# Patient Record
Sex: FEMALE | Race: OTHER | HISPANIC OR LATINO
[De-identification: names, ages, dates, MRNs, and addresses within clinical notes are randomized per-mention and may not be internally consistent; named-entity substitution may affect disease eponyms.]

---

## 2023-08-25 ENCOUNTER — APPOINTMENT (OUTPATIENT)
Dept: NEUROLOGY | Facility: CLINIC | Age: 28
End: 2023-08-25
Payer: MEDICAID

## 2023-08-25 VITALS
WEIGHT: 146 LBS | DIASTOLIC BLOOD PRESSURE: 83 MMHG | SYSTOLIC BLOOD PRESSURE: 122 MMHG | OXYGEN SATURATION: 98 % | HEART RATE: 94 BPM | TEMPERATURE: 98.1 F

## 2023-08-25 DIAGNOSIS — Z78.9 OTHER SPECIFIED HEALTH STATUS: ICD-10-CM

## 2023-08-25 DIAGNOSIS — R42 DIZZINESS AND GIDDINESS: ICD-10-CM

## 2023-08-25 PROBLEM — Z00.00 ENCOUNTER FOR PREVENTIVE HEALTH EXAMINATION: Status: ACTIVE | Noted: 2023-08-25

## 2023-08-25 PROCEDURE — 99204 OFFICE O/P NEW MOD 45 MIN: CPT

## 2023-08-25 NOTE — REVIEW OF SYSTEMS
[As Noted in HPI] : as noted in HPI [Dizziness] : dizziness [Migraine Headache] : migraine headaches [Fever] : no fever [Chills] : no chills [Confused or Disoriented] : no confusion [Memory Lapses or Loss] : no memory loss [Facial Weakness] : no facial weakness [Arm Weakness] : no arm weakness [Hand Weakness] : no hand weakness [Leg Weakness] : no leg weakness [Numbness] : no numbness [Tingling] : no tingling [Anxiety] : no anxiety [Depression] : no depression [Eye Pain] : no eye pain [Loss Of Hearing] : no hearing loss

## 2023-08-25 NOTE — HISTORY OF PRESENT ILLNESS
[FreeTextEntry1] : Sheila Toledo is a 28 year-old woman who presented to [facility] with headaches, dizziness, tinnitus and blurry vision x 15 days. CTH normal. Symptoms initially resolved and then returned about 4 days ago. She describes pain as a pulsatile or throbbing sensation around her head with associated neck pain, blurred vision, dizziness, sound sensitivity and sensation of water in her ears. She had headaches similar to this in the past about 6 years ago which worsened while she was pregnant. Headaches generally relieved by taking OTC analgesics but she states symptoms return later in the day. Headaches come on about several times a week and often last 1-2 days. She has no further complaints.

## 2023-08-25 NOTE — REASON FOR VISIT
[Initial Evaluation] : an initial evaluation [[employer] ] : provided by [employer]   [Interpreters_IDNumber] : [id number] [Interpreters_FullName] : Hilda

## 2023-08-25 NOTE — PHYSICAL EXAM
[FreeTextEntry1] : GENERAL PHYSICAL EXAM: GEN: no distress, normal affect EYES: sclera white, conjunctiva clear, no nystagmus CV: normal rhythm PULM: no respiratory distress, normal rhythm and effort EXT: no edema, no cyanosis MSK: muscle tone and strength normal SKIN: warm, dry, no rash or lesion on exposed skin   NEUROLOGICAL EXAM: Mental Status Orientation: alert and oriented to person, place, time, and situation Language: clear and fluent, intact comprehension and repetition  Cranial Nerves II: visual fields full to confrontation  III, IV, VI: PERRL, EOMI V, VII: facial sensation and movement intact and symmetric  VIII: hearing intact  IX, X: uvula midline, soft palate elevates normally  XI: BL shoulder shrug intact  XII: tongue midline  Motor Shoulder abd: 5 (R), 5 (L) EF/EE: 5 (R), 5 (L) WF/WE: 5 (R), 5 (L) hand : 5 (R), 5 (L) HF/HE: 5 (R), 5 (L) KF/KE: 5 (R), 5 (L) DF/PF: 5 (R), 5 (L)  Tone and bulk are normal in upper and lower limbs No pronator drift  Sensation Intact to light touch in all 4 EXTs  Reflex 2+ in BL biceps, brachioradialis, patella  Coordination Normal FTN bilaterally Able to perform rapid, alternating movements  Gait Normal stance, stride, and pivot turn

## 2023-08-25 NOTE — DISCUSSION/SUMMARY
[FreeTextEntry1] : Sheila Toledo is a 28 year-old woman who presented to the office today for evaluation of headaches. CTH at [facility] was unremarkable. Will order MRI and MRV head, as she recently started oral contraceptive medication. We discussed the roles of abortive and preventative medicine, and she is interested in starting headache prophylaxis. Will begin nortriptyline 10mg QHS and she will continue OTC analgesics PRN. She was advised to limit PRN use to no more than 3x/week to reduce risk of medication overuse headaches. Follow- up with me in 2 months. All of her questions and concerns were addressed.

## 2023-09-01 ENCOUNTER — APPOINTMENT (OUTPATIENT)
Dept: MRI IMAGING | Facility: CLINIC | Age: 28
End: 2023-09-01
Payer: MEDICAID

## 2023-09-01 ENCOUNTER — OUTPATIENT (OUTPATIENT)
Dept: OUTPATIENT SERVICES | Facility: HOSPITAL | Age: 28
LOS: 1 days | End: 2023-09-01

## 2023-09-01 DIAGNOSIS — R51.9 HEADACHE, UNSPECIFIED: ICD-10-CM

## 2023-09-01 PROCEDURE — 70551 MRI BRAIN STEM W/O DYE: CPT | Mod: 26

## 2023-09-01 PROCEDURE — 70544 MR ANGIOGRAPHY HEAD W/O DYE: CPT | Mod: 26,XU

## 2023-09-06 ENCOUNTER — NON-APPOINTMENT (OUTPATIENT)
Age: 28
End: 2023-09-06

## 2023-10-27 ENCOUNTER — APPOINTMENT (OUTPATIENT)
Dept: NEUROLOGY | Facility: CLINIC | Age: 28
End: 2023-10-27
Payer: MEDICAID

## 2023-10-27 VITALS
WEIGHT: 143 LBS | HEART RATE: 70 BPM | OXYGEN SATURATION: 98 % | TEMPERATURE: 98 F | DIASTOLIC BLOOD PRESSURE: 65 MMHG | SYSTOLIC BLOOD PRESSURE: 113 MMHG

## 2023-10-27 DIAGNOSIS — R51.9 HEADACHE, UNSPECIFIED: ICD-10-CM

## 2023-10-27 PROCEDURE — 99213 OFFICE O/P EST LOW 20 MIN: CPT

## 2023-10-27 RX ORDER — NORTRIPTYLINE HYDROCHLORIDE 10 MG/1
10 CAPSULE ORAL
Qty: 30 | Refills: 5 | Status: ACTIVE | COMMUNITY
Start: 2023-08-30 | End: 1900-01-01

## 2024-05-24 ENCOUNTER — APPOINTMENT (OUTPATIENT)
Dept: NEUROLOGY | Facility: CLINIC | Age: 29
End: 2024-05-24

## 2025-02-17 ENCOUNTER — APPOINTMENT (OUTPATIENT)
Dept: GASTROENTEROLOGY | Facility: CLINIC | Age: 30
End: 2025-02-17
Payer: COMMERCIAL

## 2025-02-17 VITALS
OXYGEN SATURATION: 98 % | HEART RATE: 75 BPM | WEIGHT: 134 LBS | DIASTOLIC BLOOD PRESSURE: 72 MMHG | HEIGHT: 59 IN | SYSTOLIC BLOOD PRESSURE: 112 MMHG | BODY MASS INDEX: 27.01 KG/M2

## 2025-02-17 DIAGNOSIS — R10.9 UNSPECIFIED ABDOMINAL PAIN: ICD-10-CM

## 2025-02-17 DIAGNOSIS — R11.0 NAUSEA: ICD-10-CM

## 2025-02-17 DIAGNOSIS — K52.9 NONINFECTIVE GASTROENTERITIS AND COLITIS, UNSPECIFIED: ICD-10-CM

## 2025-02-17 DIAGNOSIS — R12 HEARTBURN: ICD-10-CM

## 2025-02-17 PROCEDURE — 99204 OFFICE O/P NEW MOD 45 MIN: CPT | Mod: 25

## 2025-02-17 RX ORDER — IBUPROFEN 600 MG/1
600 TABLET, FILM COATED ORAL
Qty: 56 | Refills: 0 | Status: DISCONTINUED | COMMUNITY
Start: 2024-12-09 | End: 2025-02-17

## 2025-02-17 RX ORDER — NITROFURANTOIN (MONOHYDRATE/MACROCRYSTALS) 25; 75 MG/1; MG/1
100 CAPSULE ORAL
Qty: 14 | Refills: 0 | Status: DISCONTINUED | COMMUNITY
Start: 2024-12-20 | End: 2025-02-17

## 2025-02-17 RX ORDER — OMEPRAZOLE 40 MG/1
40 CAPSULE, DELAYED RELEASE ORAL
Qty: 1 | Refills: 3 | Status: ACTIVE | COMMUNITY
Start: 2025-02-17 | End: 1900-01-01

## 2025-02-17 RX ORDER — DOCUSATE SODIUM 100 MG/1
100 CAPSULE, LIQUID FILLED ORAL
Qty: 14 | Refills: 0 | Status: DISCONTINUED | COMMUNITY
Start: 2025-01-11 | End: 2025-02-17

## 2025-02-17 RX ORDER — ACETAZOLAMIDE 500 MG/1
500 CAPSULE ORAL
Qty: 60 | Refills: 0 | Status: ACTIVE | COMMUNITY
Start: 2025-02-07

## 2025-02-17 RX ORDER — TERCONAZOLE 8 MG/G
0.8 CREAM VAGINAL
Qty: 20 | Refills: 0 | Status: DISCONTINUED | COMMUNITY
Start: 2024-09-27 | End: 2025-02-17

## 2025-02-17 RX ORDER — ACETAMINOPHEN 650 MG/1
650 TABLET, EXTENDED RELEASE ORAL
Qty: 56 | Refills: 0 | Status: DISCONTINUED | COMMUNITY
Start: 2024-12-09 | End: 2025-02-17

## 2025-02-17 RX ORDER — FLUTICASONE PROPIONATE 50 UG/1
50 SPRAY, METERED NASAL
Qty: 16 | Refills: 0 | Status: DISCONTINUED | COMMUNITY
Start: 2024-11-26 | End: 2025-02-17

## 2025-02-17 RX ORDER — METRONIDAZOLE 500 MG/1
500 TABLET ORAL
Qty: 21 | Refills: 0 | Status: DISCONTINUED | COMMUNITY
Start: 2025-01-10 | End: 2025-02-17

## 2025-02-17 RX ORDER — ACETAZOLAMIDE 250 MG/1
250 TABLET ORAL
Qty: 60 | Refills: 0 | Status: DISCONTINUED | COMMUNITY
Start: 2024-08-20 | End: 2025-02-17

## 2025-02-17 RX ORDER — CETIRIZINE HYDROCHLORIDE 10 MG/1
10 TABLET, COATED ORAL
Qty: 30 | Refills: 0 | Status: DISCONTINUED | COMMUNITY
Start: 2025-02-10 | End: 2025-02-17

## 2025-02-20 ENCOUNTER — NON-APPOINTMENT (OUTPATIENT)
Age: 30
End: 2025-02-20

## 2025-03-12 ENCOUNTER — LABORATORY RESULT (OUTPATIENT)
Age: 30
End: 2025-03-12